# Patient Record
Sex: FEMALE | Race: WHITE | NOT HISPANIC OR LATINO | Employment: UNEMPLOYED | URBAN - METROPOLITAN AREA
[De-identification: names, ages, dates, MRNs, and addresses within clinical notes are randomized per-mention and may not be internally consistent; named-entity substitution may affect disease eponyms.]

---

## 2017-02-02 DIAGNOSIS — O09.511 ELDERLY PRIMIGRAVIDA, FIRST TRIMESTER: Primary | ICD-10-CM

## 2017-02-07 ENCOUNTER — TELEPHONE (OUTPATIENT)
Dept: MATERNAL FETAL MEDICINE | Facility: CLINIC | Age: 37
End: 2017-02-07

## 2017-02-07 NOTE — TELEPHONE ENCOUNTER
Receipt via fax from [facility] [employer] of notification of approval for requested services on patient.    Authorization Number: [id number]  Procedure/Service And Visits/Units Authorized: Office Visit x 10  Authorized Service Dates: 02/27/2017-05/07/2017    Copy scanned in to patient's medical record.

## 2017-02-27 ENCOUNTER — OFFICE VISIT (OUTPATIENT)
Dept: MATERNAL FETAL MEDICINE | Facility: CLINIC | Age: 37
End: 2017-02-27
Payer: MEDICAID

## 2017-02-27 VITALS
HEIGHT: 62 IN | DIASTOLIC BLOOD PRESSURE: 88 MMHG | BODY MASS INDEX: 44.29 KG/M2 | SYSTOLIC BLOOD PRESSURE: 138 MMHG | WEIGHT: 240.69 LBS

## 2017-02-27 DIAGNOSIS — Z36.89 ENCOUNTER FOR FETAL ANATOMIC SURVEY: Primary | ICD-10-CM

## 2017-02-27 DIAGNOSIS — E03.9 HYPOTHYROIDISM AFFECTING PREGNANCY IN SECOND TRIMESTER: ICD-10-CM

## 2017-02-27 DIAGNOSIS — O09.512 ADVANCED MATERNAL AGE, PRIMIGRAVIDA IN SECOND TRIMESTER, ANTEPARTUM: ICD-10-CM

## 2017-02-27 DIAGNOSIS — O09.511 ELDERLY PRIMIGRAVIDA, FIRST TRIMESTER: ICD-10-CM

## 2017-02-27 DIAGNOSIS — O99.282 HYPOTHYROIDISM AFFECTING PREGNANCY IN SECOND TRIMESTER: ICD-10-CM

## 2017-02-27 PROCEDURE — 76813 OB US NUCHAL MEAS 1 GEST: CPT | Mod: S$GLB,,, | Performed by: OBSTETRICS & GYNECOLOGY

## 2017-02-27 PROCEDURE — 99203 OFFICE O/P NEW LOW 30 MIN: CPT | Mod: GT,S$GLB,, | Performed by: OBSTETRICS & GYNECOLOGY

## 2017-02-27 PROCEDURE — 76801 OB US < 14 WKS SINGLE FETUS: CPT | Mod: S$GLB,,, | Performed by: OBSTETRICS & GYNECOLOGY

## 2017-02-27 RX ORDER — LEVOTHYROXINE SODIUM 25 UG/1
TABLET ORAL
Refills: 6 | COMMUNITY
Start: 2017-01-31

## 2017-02-27 RX ORDER — PROMETHAZINE HYDROCHLORIDE 25 MG/1
TABLET ORAL
Refills: 3 | COMMUNITY
Start: 2017-01-23

## 2017-02-27 NOTE — PROGRESS NOTES
Chief complaint: Advanced maternal age in pregnancy    Provider requesting consultation: Dr. Haynes    Age based risk for Down Syndrome at this gestational age is approximately 1 in 270    Aneuploidy screening this pregnancy  not yet done    A detailed 1st trimester  ultrasound was completed today.  See official report in the imaging section of [facility].  Ultrasound noted no obvious fetal abnormalities.  Ultrasound findings consistent with established dating.        Advanced maternal age counseling and recommendations:    Today I counseled the patient on the relationship between maternal age and genetic aneuploidy.  We discussed the risks and benefits of screening tests versus definitive genetic testing (amniocentesis). She was counseled about her specific age related risk of Down Syndrome. We discussed the limitations of ultrasound in the definitive diagnosis of Down Syndrome and we discussed amniocentesis as providing definitive diagnosis.  I quoted the patient a 1 in 500 procedure related risk of fetal loss with genetic amniocenetesis.  I also discussed with the patient the option of non-invasive prenatal testing (NIPT) (ex. Materni T21) including the sensitivity and specificity of the test.  Her questions were answered and after today's consultation she did not want to pursue amniocentesis.  She did want to pursue NIPT.    Recommendations from our MFM group at [facility]:  -For women who are of advanced maternal age at the time of delivery we recommend a follow up fetal ultrasound at 32-34 weeks for interval fetal growth and well being (biophysical profile).      We also discussed her hypothyroid.  Hypothyroidism in pregnancy counseling and recommendations:    In patients with underlying hypothyoidism, ACOG currently recommends following patients with TSH levels every four weeks until replacement therapy corrects the TSH into a normal range.  Because of the unclear association of maternal subclinical hypothyroidism with  decreased intelligence testing in their offspring, some experts recommend keeping the TSH level in the lower third of normal.  Once TSH level is in an adequate range, rechecking the TSH level every trimester is indicated.  Reconsult MFM as clinically indicated.    In addition, follow up of fetal growth is indicated 2/2 a slightly increased risk of IUGR.

## 2017-03-10 ENCOUNTER — TELEPHONE (OUTPATIENT)
Dept: MATERNAL FETAL MEDICINE | Facility: CLINIC | Age: 37
End: 2017-03-10